# Patient Record
Sex: MALE | Race: BLACK OR AFRICAN AMERICAN | Employment: UNEMPLOYED | URBAN - METROPOLITAN AREA
[De-identification: names, ages, dates, MRNs, and addresses within clinical notes are randomized per-mention and may not be internally consistent; named-entity substitution may affect disease eponyms.]

---

## 2020-04-11 ENCOUNTER — HOSPITAL ENCOUNTER (EMERGENCY)
Age: 30
Discharge: HOME | End: 2020-04-12
Attending: EMERGENCY MEDICINE | Admitting: EMERGENCY MEDICINE
Payer: COMMERCIAL

## 2020-04-11 DIAGNOSIS — F10.932: Primary | ICD-10-CM

## 2020-04-11 LAB
BASOPHILS # BLD: 0 K/UL (ref 0–0.1)
BASOPHILS NFR BLD: 0 % (ref 0–1)
COMMENT, HOLDF: NORMAL
DIFFERENTIAL METHOD BLD: ABNORMAL
EOSINOPHIL # BLD: 0 K/UL (ref 0–0.4)
EOSINOPHIL NFR BLD: 0 % (ref 0–7)
ERYTHROCYTE [DISTWIDTH] IN BLOOD BY AUTOMATED COUNT: 12.3 % (ref 11.5–14.5)
HCT VFR BLD AUTO: 42.4 % (ref 36.6–50.3)
HGB BLD-MCNC: 14.3 G/DL (ref 12.1–17)
IMM GRANULOCYTES # BLD AUTO: 0 K/UL (ref 0–0.04)
IMM GRANULOCYTES NFR BLD AUTO: 1 % (ref 0–0.5)
LYMPHOCYTES # BLD: 1.5 K/UL (ref 0.8–3.5)
LYMPHOCYTES NFR BLD: 17 % (ref 12–49)
MCH RBC QN AUTO: 30.5 PG (ref 26–34)
MCHC RBC AUTO-ENTMCNC: 33.7 G/DL (ref 30–36.5)
MCV RBC AUTO: 90.4 FL (ref 80–99)
MONOCYTES # BLD: 0.5 K/UL (ref 0–1)
MONOCYTES NFR BLD: 6 % (ref 5–13)
NEUTS SEG # BLD: 6.5 K/UL (ref 1.8–8)
NEUTS SEG NFR BLD: 76 % (ref 32–75)
NRBC # BLD: 0 K/UL (ref 0–0.01)
NRBC BLD-RTO: 0 PER 100 WBC
PLATELET # BLD AUTO: 150 K/UL (ref 150–400)
PMV BLD AUTO: 11.1 FL (ref 8.9–12.9)
RBC # BLD AUTO: 4.69 M/UL (ref 4.1–5.7)
SAMPLES BEING HELD,HOLD: NORMAL
WBC # BLD AUTO: 8.6 K/UL (ref 4.1–11.1)

## 2020-04-11 PROCEDURE — 96376 TX/PRO/DX INJ SAME DRUG ADON: CPT

## 2020-04-11 PROCEDURE — 96374 THER/PROPH/DIAG INJ IV PUSH: CPT

## 2020-04-11 PROCEDURE — 80048 BASIC METABOLIC PNL TOTAL CA: CPT

## 2020-04-11 PROCEDURE — 74011250636 HC RX REV CODE- 250/636: Performed by: EMERGENCY MEDICINE

## 2020-04-11 PROCEDURE — 99284 EMERGENCY DEPT VISIT MOD MDM: CPT

## 2020-04-11 PROCEDURE — 74011250637 HC RX REV CODE- 250/637: Performed by: EMERGENCY MEDICINE

## 2020-04-11 PROCEDURE — 85025 COMPLETE CBC W/AUTO DIFF WBC: CPT

## 2020-04-11 PROCEDURE — 36415 COLL VENOUS BLD VENIPUNCTURE: CPT

## 2020-04-11 PROCEDURE — 80307 DRUG TEST PRSMV CHEM ANLYZR: CPT

## 2020-04-11 RX ORDER — LORAZEPAM 2 MG/ML
2 INJECTION INTRAMUSCULAR
Status: DISCONTINUED | OUTPATIENT
Start: 2020-04-11 | End: 2020-04-11

## 2020-04-11 RX ORDER — PHENOBARBITAL SODIUM 65 MG/ML
260 INJECTION INTRAMUSCULAR
Status: COMPLETED | OUTPATIENT
Start: 2020-04-12 | End: 2020-04-12

## 2020-04-11 RX ORDER — CHLORDIAZEPOXIDE HYDROCHLORIDE 25 MG/1
50 CAPSULE, GELATIN COATED ORAL
Status: COMPLETED | OUTPATIENT
Start: 2020-04-11 | End: 2020-04-11

## 2020-04-11 RX ORDER — PHENOBARBITAL SODIUM 65 MG/ML
130 INJECTION INTRAMUSCULAR
Status: COMPLETED | OUTPATIENT
Start: 2020-04-11 | End: 2020-04-11

## 2020-04-11 RX ADMIN — CHLORDIAZEPOXIDE HYDROCHLORIDE 50 MG: 25 CAPSULE ORAL at 22:20

## 2020-04-11 RX ADMIN — PHENOBARBITAL SODIUM 130 MG: 65 INJECTION INTRAMUSCULAR; INTRAVENOUS at 23:32

## 2020-04-12 VITALS
SYSTOLIC BLOOD PRESSURE: 146 MMHG | WEIGHT: 221 LBS | BODY MASS INDEX: 30.94 KG/M2 | HEIGHT: 71 IN | DIASTOLIC BLOOD PRESSURE: 72 MMHG | HEART RATE: 88 BPM | TEMPERATURE: 99.1 F | RESPIRATION RATE: 22 BRPM | OXYGEN SATURATION: 97 %

## 2020-04-12 LAB
ANION GAP SERPL CALC-SCNC: 14 MMOL/L (ref 5–15)
BUN SERPL-MCNC: 12 MG/DL (ref 6–20)
BUN/CREAT SERPL: 12 (ref 12–20)
CALCIUM SERPL-MCNC: 9.7 MG/DL (ref 8.5–10.1)
CHLORIDE SERPL-SCNC: 98 MMOL/L (ref 97–108)
CO2 SERPL-SCNC: 24 MMOL/L (ref 21–32)
CREAT SERPL-MCNC: 0.97 MG/DL (ref 0.7–1.3)
ETHANOL SERPL-MCNC: 47 MG/DL
GLUCOSE SERPL-MCNC: 70 MG/DL (ref 65–100)
POTASSIUM SERPL-SCNC: 3.9 MMOL/L (ref 3.5–5.1)
SODIUM SERPL-SCNC: 136 MMOL/L (ref 136–145)

## 2020-04-12 PROCEDURE — 96376 TX/PRO/DX INJ SAME DRUG ADON: CPT

## 2020-04-12 PROCEDURE — 74011250636 HC RX REV CODE- 250/636: Performed by: EMERGENCY MEDICINE

## 2020-04-12 RX ADMIN — PHENOBARBITAL SODIUM 260 MG: 65 INJECTION INTRAMUSCULAR; INTRAVENOUS at 00:02

## 2020-04-12 NOTE — ED PROVIDER NOTES
The history is provided by the patient. Alcohol intoxication   Primary symptoms include: agitation, hallucinations (flashing lights in field of vision) and intoxication (drinking 6 to 8 bottles of wine daily for over 2 weeks, stopped this morning and has been sipping wine talking to his sponser trying to self-treat tremors). This is a new problem. The current episode started 12 to 24 hours ago. The problem has been gradually worsening. Suspected agents include alcohol and opiates. Pertinent negatives include no fever and no vomiting. Associated medical issues include withdrawal syndrome and depression (with loss of job, was 8 months sober and relapsed 2 weeks or more ago). Associated medical issues do not include recent illness. Past Medical History:   Diagnosis Date    Alcohol abuse        Past Surgical History:   Procedure Laterality Date    HX APPENDECTOMY           History reviewed. No pertinent family history. Social History     Socioeconomic History    Marital status: SINGLE     Spouse name: Not on file    Number of children: Not on file    Years of education: Not on file    Highest education level: Not on file   Occupational History    Not on file   Social Needs    Financial resource strain: Not on file    Food insecurity     Worry: Not on file     Inability: Not on file    Transportation needs     Medical: Not on file     Non-medical: Not on file   Tobacco Use    Smoking status: Never Smoker   Substance and Sexual Activity    Alcohol use:  Yes    Drug use: Not Currently     Types: Cocaine    Sexual activity: Not on file   Lifestyle    Physical activity     Days per week: Not on file     Minutes per session: Not on file    Stress: Not on file   Relationships    Social connections     Talks on phone: Not on file     Gets together: Not on file     Attends [employer] service: Not on file     Active member of club or organization: Not on file     Attends meetings of clubs or organizations: Not on file     Relationship status: Not on file    Intimate partner violence     Fear of current or ex partner: Not on file     Emotionally abused: Not on file     Physically abused: Not on file     Forced sexual activity: Not on file   Other Topics Concern    Not on file   Social History Narrative    Not on file         ALLERGIES: Patient has no known allergies. Review of Systems   Constitutional: Negative for fever. Gastrointestinal: Negative for vomiting. Psychiatric/Behavioral: Positive for agitation, depression (with loss of job, was 8 months sober and relapsed 2 weeks or more ago) and hallucinations (flashing lights in field of vision). All other systems reviewed and are negative. Vitals:    04/11/20 2155 04/11/20 2230 04/11/20 2330   BP: 161/80 159/77 156/63   Pulse: (!) 104 94 96   Resp: 18 23 26   Temp: 99.1 °F (37.3 °C)     SpO2: 97% 95% 95%   Weight: 100.2 kg (221 lb)     Height: 5' 11\" (1.803 m)              Physical Exam  Vitals signs and nursing note reviewed. Constitutional:       General: He is not in acute distress. Appearance: Normal appearance. He is well-developed. HENT:      Head: Normocephalic and atraumatic. Mouth/Throat:      Mouth: Mucous membranes are moist.   Eyes:      Conjunctiva/sclera: Conjunctivae normal.   Neck:      Musculoskeletal: Neck supple. Trachea: No tracheal deviation. Cardiovascular:      Rate and Rhythm: Regular rhythm. Tachycardia present. Heart sounds: Normal heart sounds. Pulmonary:      Effort: Pulmonary effort is normal. No respiratory distress. Breath sounds: Normal breath sounds. Abdominal:      General: There is no distension. Palpations: Abdomen is soft. Musculoskeletal: Normal range of motion. General: No deformity. Skin:     General: Skin is warm and dry. Neurological:      Mental Status: He is alert and oriented to person, place, and time.       Cranial Nerves: No cranial nerve deficit. Motor: Tremor (gross motor of both hands worse with fine motor movements) present. Psychiatric:         Attention and Perception: Attention normal. He perceives visual (seeing flashing lights, has insight) hallucinations. Mood and Affect: Mood is anxious. Speech: Speech normal.         Behavior: Behavior is hyperactive. Behavior is not agitated, aggressive or combative. Thought Content: Thought content is not delusional. Thought content does not include homicidal or suicidal ideation. Cognition and Memory: Cognition and memory normal.         Judgment: Judgment normal.          MDM     34-year-old male presents with tremors and perceptual disturbances with seeing flashing lights. 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 50 mg of chlordiazepoxide had no effect. After shared decision making with patient escalating doses of phenobarbital were administered. He received 390 mg in total of phenobarbital and had good relief of his symptoms. His heart rate went from over 100 to low 80s, his tremor subsided, and he was monitored in the emergency department until 4 hours after chlordiazepoxide administration to ensure no respiratory depression without any complications. Discussed outpatient evaluation with rehabilitation program for which he is medically cleared. Discussed strict abstinence from alcohol, he is being picked up by his mother who will monitor him for relapse. Return precautions were discussed for worsening or new concerning symptoms.     Procedures

## 2020-04-12 NOTE — ED NOTES
Bedside and Verbal shift change report given to Leslie Romero RN (oncoming nurse) by Braxton Aguilar RN (offgoing nurse). Report included the following information SBAR, ED Summary, MAR and Recent Results.

## 2020-04-12 NOTE — ED TRIAGE NOTES
Patient arrives to the ED with c/o alcohol abuse, states 8 months sober then last drank 750 mls of wine this morning, patient coming to ED with concerns of DTs. Some tremors noted in triage.

## 2023-04-29 ENCOUNTER — HOSPITAL ENCOUNTER (EMERGENCY)
Age: 33
Discharge: HOME | End: 2023-04-29
Attending: EMERGENCY MEDICINE
Payer: COMMERCIAL

## 2023-04-29 VITALS
DIASTOLIC BLOOD PRESSURE: 81 MMHG | BODY MASS INDEX: 27.7 KG/M2 | OXYGEN SATURATION: 97 % | TEMPERATURE: 98 F | HEART RATE: 54 BPM | RESPIRATION RATE: 26 BRPM | WEIGHT: 198.63 LBS | SYSTOLIC BLOOD PRESSURE: 129 MMHG

## 2023-04-29 DIAGNOSIS — R00.2 PALPITATIONS: Primary | ICD-10-CM

## 2023-04-29 LAB
ALBUMIN SERPL-MCNC: 4.8 G/DL (ref 3.5–5)
ALBUMIN/GLOB SERPL: 1.5 (ref 1.1–2.2)
ALP SERPL-CCNC: 51 U/L (ref 45–117)
ALT SERPL-CCNC: 36 U/L (ref 12–78)
ANION GAP SERPL CALC-SCNC: 11 MMOL/L (ref 5–15)
AST SERPL-CCNC: 50 U/L (ref 15–37)
ATRIAL RATE: 62 BPM
BASOPHILS # BLD: 0 K/UL (ref 0–0.1)
BASOPHILS NFR BLD: 1 % (ref 0–1)
BILIRUB SERPL-MCNC: 0.8 MG/DL (ref 0.2–1)
BUN SERPL-MCNC: 15 MG/DL (ref 6–20)
BUN/CREAT SERPL: 11 (ref 12–20)
CALCIUM SERPL-MCNC: 9.6 MG/DL (ref 8.5–10.1)
CALCULATED P AXIS, ECG09: 13 DEGREES
CALCULATED R AXIS, ECG10: 2 DEGREES
CALCULATED T AXIS, ECG11: 26 DEGREES
CHLORIDE SERPL-SCNC: 103 MMOL/L (ref 97–108)
CO2 SERPL-SCNC: 28 MMOL/L (ref 21–32)
COMMENT, HOLDF: NORMAL
CREAT SERPL-MCNC: 1.37 MG/DL (ref 0.7–1.3)
DIAGNOSIS, 93000: NORMAL
DIFFERENTIAL METHOD BLD: ABNORMAL
EOSINOPHIL # BLD: 0 K/UL (ref 0–0.4)
EOSINOPHIL NFR BLD: 0 % (ref 0–7)
ERYTHROCYTE [DISTWIDTH] IN BLOOD BY AUTOMATED COUNT: 11.7 % (ref 11.5–14.5)
GLOBULIN SER CALC-MCNC: 3.3 G/DL (ref 2–4)
GLUCOSE SERPL-MCNC: 81 MG/DL (ref 65–100)
HCT VFR BLD AUTO: 44.5 % (ref 36.6–50.3)
HGB BLD-MCNC: 15 G/DL (ref 12.1–17)
IMM GRANULOCYTES # BLD AUTO: 0 K/UL (ref 0–0.04)
IMM GRANULOCYTES NFR BLD AUTO: 1 % (ref 0–0.5)
LYMPHOCYTES # BLD: 1 K/UL (ref 0.8–3.5)
LYMPHOCYTES NFR BLD: 26 % (ref 12–49)
MAGNESIUM SERPL-MCNC: 2 MG/DL (ref 1.6–2.4)
MCH RBC QN AUTO: 30.5 PG (ref 26–34)
MCHC RBC AUTO-ENTMCNC: 33.7 G/DL (ref 30–36.5)
MCV RBC AUTO: 90.6 FL (ref 80–99)
MONOCYTES # BLD: 0.2 K/UL (ref 0–1)
MONOCYTES NFR BLD: 5 % (ref 5–13)
NEUTS SEG # BLD: 2.6 K/UL (ref 1.8–8)
NEUTS SEG NFR BLD: 67 % (ref 32–75)
NRBC # BLD: 0 K/UL (ref 0–0.01)
NRBC BLD-RTO: 0 PER 100 WBC
P-R INTERVAL, ECG05: 168 MS
PLATELET # BLD AUTO: 153 K/UL (ref 150–400)
PMV BLD AUTO: 12.6 FL (ref 8.9–12.9)
POTASSIUM SERPL-SCNC: 4.2 MMOL/L (ref 3.5–5.1)
PROT SERPL-MCNC: 8.1 G/DL (ref 6.4–8.2)
Q-T INTERVAL, ECG07: 408 MS
QRS DURATION, ECG06: 90 MS
QTC CALCULATION (BEZET), ECG08: 414 MS
RBC # BLD AUTO: 4.91 M/UL (ref 4.1–5.7)
SAMPLES BEING HELD,HOLD: NORMAL
SODIUM SERPL-SCNC: 142 MMOL/L (ref 136–145)
TROPONIN I SERPL HS-MCNC: 45 NG/L (ref 0–76)
VENTRICULAR RATE, ECG03: 62 BPM
WBC # BLD AUTO: 3.8 K/UL (ref 4.1–11.1)

## 2023-04-29 PROCEDURE — 80053 COMPREHEN METABOLIC PANEL: CPT

## 2023-04-29 PROCEDURE — 36415 COLL VENOUS BLD VENIPUNCTURE: CPT

## 2023-04-29 PROCEDURE — 83735 ASSAY OF MAGNESIUM: CPT

## 2023-04-29 PROCEDURE — 99284 EMERGENCY DEPT VISIT MOD MDM: CPT

## 2023-04-29 PROCEDURE — 85025 COMPLETE CBC W/AUTO DIFF WBC: CPT

## 2023-04-29 PROCEDURE — 93005 ELECTROCARDIOGRAM TRACING: CPT

## 2023-04-29 PROCEDURE — 84484 ASSAY OF TROPONIN QUANT: CPT

## 2023-04-29 RX ORDER — BUSPIRONE HYDROCHLORIDE 15 MG/1
15 TABLET ORAL 3 TIMES DAILY
COMMUNITY

## 2023-04-29 RX ORDER — ESCITALOPRAM OXALATE 10 MG/1
10 TABLET ORAL DAILY
COMMUNITY

## 2023-04-29 RX ORDER — TRAZODONE HYDROCHLORIDE 100 MG/1
100 TABLET ORAL
COMMUNITY

## 2023-04-29 NOTE — ED PROVIDER NOTES
27-year-old male with history of anxiety, presents to the emergency department complaining of palpitations that started shortly prior to arrival.  The symptoms started after he had done a cardio workout and felt like his heart was skipping beats. He noted some associated mild shortness of breath but denies any chest pain, nausea, vomiting, diaphoresis, syncope, lightheadedness. He denies any history of prior palpitations or arrhythmias or cardiac issues although he was having some intermittent sharp pains in his chest that he reported to his primary care provider. He then saw a cardiologist who told him that his EKG was reassuring and scheduled him for an echocardiogram which she has coming up. He denies any family history of early heart disease or sudden cardiac death. He does state that he has been using preworkout stimulant drinks prior to his workout but denies any other significant caffeine use or recent EtOH use. He denies any further symptoms currently. Chest Pain (Angina)   Associated symptoms include palpitations. Pertinent negatives include no abdominal pain, no back pain, no cough, no fever, no headaches, no nausea, no numbness, no shortness of breath, no vomiting and no weakness. Palpitations   Associated symptoms include chest pain. Pertinent negatives include no fever, no numbness, no abdominal pain, no nausea, no vomiting, no headaches, no back pain, no weakness, no cough and no shortness of breath. Past Medical History:   Diagnosis Date    Alcohol abuse     Psychiatric disorder     Anxiety       Past Surgical History:   Procedure Laterality Date    HX APPENDECTOMY           History reviewed. No pertinent family history.     Social History     Socioeconomic History    Marital status: SINGLE     Spouse name: Not on file    Number of children: Not on file    Years of education: Not on file    Highest education level: Not on file   Occupational History    Not on file   Tobacco Use Smoking status: Never    Smokeless tobacco: Not on file   Vaping Use    Vaping Use: Not on file   Substance and Sexual Activity    Alcohol use: Not Currently    Drug use: Not Currently    Sexual activity: Not on file   Other Topics Concern    Not on file   Social History Narrative    Not on file     Social Determinants of Health     Financial Resource Strain: Not on file   Food Insecurity: Not on file   Transportation Needs: Not on file   Physical Activity: Not on file   Stress: Not on file   Social Connections: Not on file   Intimate Partner Violence: Not on file   Housing Stability: Not on file         ALLERGIES: Patient has no known allergies. Review of Systems   Constitutional:  Negative for activity change, appetite change, chills and fever. HENT:  Negative for congestion, rhinorrhea, sinus pain, sneezing and sore throat. Eyes:  Negative for photophobia and visual disturbance. Respiratory:  Negative for cough and shortness of breath. Cardiovascular:  Positive for chest pain and palpitations. Negative for leg swelling. Gastrointestinal:  Negative for abdominal pain, blood in stool, constipation, diarrhea, nausea and vomiting. Genitourinary:  Negative for difficulty urinating, dysuria, flank pain, hematuria, penile pain and testicular pain. Musculoskeletal:  Negative for arthralgias, back pain, myalgias and neck pain. Skin:  Negative for rash and wound. Neurological:  Negative for syncope, weakness, light-headedness, numbness and headaches. Psychiatric/Behavioral:  Negative for self-injury and suicidal ideas. All other systems reviewed and are negative. Vitals:    04/29/23 1041   BP: (!) 147/75   Pulse: 61   Resp: 18   Temp: 98 °F (36.7 °C)   SpO2: 97%   Weight: 90.1 kg (198 lb 10.2 oz)            Physical Exam  Vitals and nursing note reviewed. Constitutional:       General: He is not in acute distress. Appearance: Normal appearance. He is well-developed.  He is not diaphoretic. Comments: Very well-appearing, no acute distress, mildly anxious. HENT:      Head: Normocephalic and atraumatic. Nose: Nose normal.   Eyes:      Extraocular Movements: Extraocular movements intact. Conjunctiva/sclera: Conjunctivae normal.      Pupils: Pupils are equal, round, and reactive to light. Cardiovascular:      Rate and Rhythm: Normal rate and regular rhythm. Heart sounds: Normal heart sounds. Pulmonary:      Effort: Pulmonary effort is normal.      Breath sounds: Normal breath sounds. Chest:      Chest wall: No tenderness. Abdominal:      General: There is no distension. Palpations: Abdomen is soft. Tenderness: There is no abdominal tenderness. Musculoskeletal:         General: No tenderness. Cervical back: Neck supple. Skin:     General: Skin is warm and dry. Neurological:      General: No focal deficit present. Mental Status: He is alert and oriented to person, place, and time. Cranial Nerves: No cranial nerve deficit. Sensory: No sensory deficit. Motor: No weakness. Coordination: Coordination normal.   Psychiatric:         Mood and Affect: Mood is anxious. Medical Decision Making  Amount and/or Complexity of Data Reviewed  Labs: ordered. ECG/medicine tests: ordered. 28-year-old male presents with palpitations. No noted ectopy on cardiac monitor or EKG. Labs returned very reassuringly showing no significant abnormalities. Reassurance was given, suspect that these palpitations are likely PVCs triggered by his preworkout stimulant. He was recommended cessation of this and cardiology follow-up for further evaluation with likely Holter monitoring. Return precautions were given for worsening or concerns. This plan was discussed with the patient at the bedside and he stated both understanding and agreement.     Please note that this dictation was completed with [employer], the computer voice recognition software. Quite often unanticipated grammatical, syntax, homophones, and other interpretive errors are inadvertently transcribed by the computer software. Please disregard these errors. Please excuse any errors that have escaped final proofreading. Procedures      1043 EKG shows normal sinus rhythm with a rate of 62 bpm with no acute ST or T wave abnormalities suggestive of ischemia. No ectopy.

## 2023-04-29 NOTE — ED TRIAGE NOTES
Patient arrives ambulatory to the ED with complaints of chest discomfort and feeling like his heart is \"leaping out of his chest.\" He was at [employer] when it happened. He also endorses some shortness of breath.

## 2023-04-29 NOTE — ED NOTES
I have reviewed discharge instructions with the patient. The patient verbalized understanding. Copy of discharge instructions provided. Patient ambulated out of the ED with a steady gait.